# Patient Record
Sex: FEMALE | Race: WHITE | NOT HISPANIC OR LATINO | URBAN - METROPOLITAN AREA
[De-identification: names, ages, dates, MRNs, and addresses within clinical notes are randomized per-mention and may not be internally consistent; named-entity substitution may affect disease eponyms.]

---
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
## 2025-01-06 ENCOUNTER — APPOINTMENT (OUTPATIENT)
Dept: PEDIATRICS | Facility: CLINIC | Age: 20
End: 2025-01-06
Payer: COMMERCIAL

## 2025-01-06 VITALS — OXYGEN SATURATION: 98 % | WEIGHT: 141 LBS | HEART RATE: 129 BPM | BODY MASS INDEX: 20.97 KG/M2 | TEMPERATURE: 97.7 F

## 2025-01-06 DIAGNOSIS — L30.9 ECZEMA, UNSPECIFIED TYPE: ICD-10-CM

## 2025-01-06 DIAGNOSIS — R06.89 DIFFICULTY BREATHING: Primary | ICD-10-CM

## 2025-01-06 PROCEDURE — 99214 OFFICE O/P EST MOD 30 MIN: CPT | Performed by: PEDIATRICS

## 2025-01-06 RX ORDER — ALBUTEROL SULFATE 90 UG/1
2 INHALANT RESPIRATORY (INHALATION) EVERY 4 HOURS PRN
Qty: 18 G | Refills: 0 | Status: SHIPPED | OUTPATIENT
Start: 2025-01-06 | End: 2026-01-06

## 2025-01-06 RX ORDER — INHALER,ASSIST DEVICE,LG MASK
SPACER (EA) MISCELLANEOUS
Qty: 1 EACH | Refills: 1 | Status: SHIPPED | OUTPATIENT
Start: 2025-01-06

## 2025-01-06 RX ORDER — ALBUTEROL SULFATE 90 UG/1
2 INHALANT RESPIRATORY (INHALATION) EVERY 4 HOURS PRN
Qty: 18 G | Refills: 0 | Status: SHIPPED | OUTPATIENT
Start: 2025-01-06 | End: 2025-01-06

## 2025-01-06 NOTE — PATIENT INSTRUCTIONS
(1) RULE-OUT EXERCISE-INDUCED ASTHMA  - REFER TO PULMONOLOGY.  CALL [phone] TO MAKE THIS APPOINTMENT. THEY MAY SEND YOU TO ADULT PULM DUE TO YOUR AGE  - LET'S TRY ALBUTEROL (TYPICAL RESCUE INHALER) WITH THE NEXT EPISODE. TAKE 2 PUFFS WITH THE SPACER BEFORE EXERCISE AND SEE IF THIS REDUCES YOUR SYMPTOMS.   (2) ECZEMA  - LET ME KNOW WHICH OINTMENT IT IS THAT YOU NEED REFILLED PLEASE (YOU CAN SEND A [employer] MESSAGE)  (3) TALK THERAPY  - I ENCOURAGE IT. START WITH YOUR INSURANCE NETWORK AND WORK FROM THERE.   (4) ACADEMIC ACCOMMODATIONS  - YOU NEED A FIRM DIAGNOSIS TO BE CONSIDERED ELIGIBLE.  - YOU CAN EITHER PURSUE THIS THROUGH SCHOOL OR LOCAL TESTING AGENCIES IN [place].   Testing centers in [place]: (testing is expensive, make sure you check your insurance network for coverage)  [facility]al and Behavioral Pediatrics Department [phone]  [facility] [phone]  [facility] [phone] (MAY BE ABLE TO TEST ADULTS TOO)  [facility] [phone]  [facility] [phone]  [facility] [phone] offers remote diagnostic sessions  [facility] [phone]  [facility] in [place] [email or web address] (also offers services in school or at their center and help navigating funding once a diagnosis is made)

## 2025-01-06 NOTE — PROGRESS NOTES
"HERE ALONE TO DISCUSS ASTHMA  \"I HAVE HAD SOME REALIZATIONS SINCE GOING OFF TO COLLEGE\"  SAYS \"I'M 99% CERTAIN I HAVE ASTHMA\"  SAYS HER PGM HAD ASTHMA AND DAD GOT \"COVID-INDUCED ASTHMA\"  SHE THINKS SHE HAS SPORTS-INDUCED ASTHMA  USES THE ERG MACHINE FOR CREW AND IT MADE HER WHEEZE  FOR YEARS HAS GOTTEN CRAMPS IN HER LATERAL TORSO WITH RUNNING  \"IT WOULD FEEL LIKE I SWALLOWED A CUP OF NEEDLES\" AFTER THAT  THAT'S WHEN THE WHEEZING STARTS.  \"SOMETIMES I CAN'T BREATHE WHEN THAT HAPPENS\" SO USUALLY STOPS THE WORKOUT  STOPPING EXERCISING HELPS  OFTEN SHE WILL \"LOOK UP\" AND \"FOCUS ON BREATHING\"    ALSO WANTS ACCOMMODATIONS AT COLLEGE. \"I DID NOT DO VERY WELL ACADEMICALLY.\"  THINKS SHE MIGHT HAVE AUTISM, ADHD, OR DYSLEXIA. HAS TRIED TO GET ASSESSED AT [facility] BUT ISN'T GETTING A STRAIGHT ANSWER.     PMHX:   HAS ALLERGIES: PCN, A SUNSCREEN (NAME?)  SISTER AND MOM HAVE ECZEMA, SHE HAS A \"STRANGE RASH THAT THTE ECZEMA CREAM WORKS FOR-- HAS TIRAMCINOLONE, PROTOPIC, AND MOMETASONE IN CHART, NOT SURE WHICH ONE SHE NEEDS)    EXAM:  GEN- ALERT, NAD  HEENT- NC/AT, MMM, NO NF  CHEST- RRR, NO M/R/G, LCTA WITHOUT FOCAL FINDINGS.      (1) RULE-OUT EXERCISE-INDUCED ASTHMA  - REFER TO PULMONOLOGY.  CALL [phone] TO MAKE THIS APPOINTMENT. THEY MAY SEND YOU TO ADULT PULM DUE TO YOUR AGE  - LET'S TRY ALBUTEROL (TYPICAL RESCUE INHALER) WITH THE NEXT EPISODE. TAKE 2 PUFFS WITH THE SPACER BEFORE EXERCISE AND SEE IF THIS REDUCES YOUR SYMPTOMS.   (2) ECZEMA  - LET ME KNOW WHICH OINTMENT IT IS THAT YOU NEED REFILLED PLEASE (YOU CAN SEND A [employer] MESSAGE)  (3) TALK THERAPY  - I ENCOURAGE IT. START WITH YOUR INSURANCE NETWORK AND WORK FROM THERE.   (4) ACADEMIC ACCOMMODATIONS  - YOU NEED A FIRM DIAGNOSIS TO BE CONSIDERED ELIGIBLE.  - YOU CAN EITHER PURSUE THIS THROUGH SCHOOL OR LOCAL TESTING AGENCIES IN [place].   Testing centers in [place]: (testing is expensive, make sure you check your insurance network for coverage)  [facility] ([facility] Developmental and Behavioral Pediatrics Department [phone] [phone]  [facility] [phone] (MAY BE ABLE TO TEST ADULTS TOO)  [facility] for [facility] [phone]  [facility] Aut[facility] Diagnostic Clinic [phone]  [facility] [phone] offers remote diagnostic sessions  [facility] The[facility] Services [phone]  [facility] in [place] [email or web address] (also offers services in school or at their center and help navigating funding once a diagnosis is made)

## 2025-08-25 ENCOUNTER — APPOINTMENT (OUTPATIENT)
Dept: PEDIATRICS | Facility: CLINIC | Age: 20
End: 2025-08-25
Payer: COMMERCIAL

## 2026-08-26 ENCOUNTER — APPOINTMENT (OUTPATIENT)
Dept: PEDIATRICS | Facility: CLINIC | Age: 21
End: 2026-08-26
Payer: COMMERCIAL